# Patient Record
Sex: FEMALE | Race: WHITE | NOT HISPANIC OR LATINO | Employment: FULL TIME | URBAN - METROPOLITAN AREA
[De-identification: names, ages, dates, MRNs, and addresses within clinical notes are randomized per-mention and may not be internally consistent; named-entity substitution may affect disease eponyms.]

---

## 2023-03-21 PROBLEM — N93.9 ABNORMAL UTERINE BLEEDING: Status: ACTIVE | Noted: 2023-03-21

## 2023-03-21 PROBLEM — H81.10 BENIGN PAROXYSMAL POSITIONAL VERTIGO: Status: ACTIVE | Noted: 2023-03-21

## 2023-03-21 PROBLEM — R19.7 DIARRHEA, UNSPECIFIED: Status: ACTIVE | Noted: 2023-03-21

## 2023-03-21 PROBLEM — N90.89 VULVAR IRRITATION: Status: ACTIVE | Noted: 2023-03-21

## 2023-03-21 PROBLEM — G43.009 MIGRAINE WITHOUT AURA AND WITHOUT STATUS MIGRAINOSUS, NOT INTRACTABLE: Status: ACTIVE | Noted: 2023-03-21

## 2023-03-21 PROBLEM — D72.829 LEUKOCYTOSIS: Status: ACTIVE | Noted: 2023-03-21

## 2023-03-21 PROBLEM — N76.2 ALLERGIC VULVITIS: Status: ACTIVE | Noted: 2023-03-21

## 2023-03-21 PROBLEM — M79.89 MASS OF SOFT TISSUE OF NECK: Status: ACTIVE | Noted: 2023-03-21

## 2023-03-21 PROBLEM — J32.3 SPHENOID SINUSITIS: Status: ACTIVE | Noted: 2023-03-21

## 2023-03-21 PROBLEM — R03.0 ELEVATED BLOOD PRESSURE READING: Status: ACTIVE | Noted: 2023-03-21

## 2023-03-21 PROBLEM — K58.0 IRRITABLE BOWEL SYNDROME WITH DIARRHEA: Status: ACTIVE | Noted: 2023-03-21

## 2023-03-21 PROBLEM — E78.5 HYPERLIPIDEMIA, UNSPECIFIED: Status: ACTIVE | Noted: 2023-03-21

## 2023-03-21 PROBLEM — E53.8 VITAMIN B12 DEFICIENCY: Status: ACTIVE | Noted: 2023-03-21

## 2023-03-21 PROBLEM — K13.79 MUCOCELE, BUCCAL: Status: ACTIVE | Noted: 2023-03-21

## 2023-03-21 PROBLEM — Z86.19 HISTORY OF HPV INFECTION: Status: ACTIVE | Noted: 2023-03-21

## 2023-03-21 PROBLEM — R53.83 FATIGUE: Status: ACTIVE | Noted: 2023-03-21

## 2023-03-21 PROBLEM — K90.89: Status: ACTIVE | Noted: 2023-03-21

## 2023-03-21 PROBLEM — R00.0 TACHYCARDIA: Status: ACTIVE | Noted: 2023-03-21

## 2023-03-21 PROBLEM — M79.89 MASS OF SOFT TISSUE OF UPPER EXTREMITY: Status: ACTIVE | Noted: 2023-03-21

## 2023-03-21 PROBLEM — R51.9 HEADACHE: Status: ACTIVE | Noted: 2023-03-21

## 2023-03-21 PROBLEM — E55.9 VITAMIN D DEFICIENCY: Status: ACTIVE | Noted: 2023-03-21

## 2023-03-21 PROBLEM — R42 VERTIGO: Status: ACTIVE | Noted: 2023-03-21

## 2023-03-21 PROBLEM — R00.2 HEART PALPITATIONS: Status: ACTIVE | Noted: 2023-03-21

## 2023-03-21 PROBLEM — D27.9 DERMOID CYST OF OVARY: Status: ACTIVE | Noted: 2023-03-21

## 2023-03-21 PROBLEM — M54.2 NECK PAIN: Status: ACTIVE | Noted: 2023-03-21

## 2023-03-21 PROBLEM — D06.9 SEVERE CERVICAL DYSPLASIA: Status: ACTIVE | Noted: 2023-03-21

## 2023-03-21 PROBLEM — R35.0 INCREASED URINARY FREQUENCY: Status: ACTIVE | Noted: 2023-03-21

## 2023-03-21 PROBLEM — R10.2 ABDOMINAL PAIN, SUPRAPUBIC: Status: ACTIVE | Noted: 2023-03-21

## 2023-03-21 PROBLEM — B37.31 VAGINAL YEAST INFECTION: Status: ACTIVE | Noted: 2023-03-21

## 2023-03-21 PROBLEM — K52.9 CHRONIC DIARRHEA: Status: ACTIVE | Noted: 2023-03-21

## 2023-03-21 PROBLEM — I83.819 VARICOSE VEINS WITH PAIN: Status: ACTIVE | Noted: 2023-03-21

## 2023-03-21 PROBLEM — G44.209 HEADACHE, TENSION-TYPE: Status: ACTIVE | Noted: 2023-03-21

## 2023-03-21 PROBLEM — K57.32 DIVERTICULITIS, COLON: Status: ACTIVE | Noted: 2023-03-21

## 2023-03-21 PROBLEM — N89.8 VAGINAL DISCHARGE: Status: ACTIVE | Noted: 2023-03-21

## 2023-03-21 PROBLEM — F41.9 ANXIETY: Status: ACTIVE | Noted: 2023-03-21

## 2023-03-21 PROBLEM — Z04.9 CONDITION NOT FOUND: Status: ACTIVE | Noted: 2023-03-21

## 2023-03-21 PROBLEM — R87.810 PAP SMEAR OF CERVIX SHOWS HIGH RISK HPV PRESENT: Status: ACTIVE | Noted: 2023-03-21

## 2023-03-21 PROBLEM — N83.201 CYST OF RIGHT OVARY: Status: ACTIVE | Noted: 2023-03-21

## 2023-03-22 ENCOUNTER — OFFICE VISIT (OUTPATIENT)
Dept: PRIMARY CARE | Facility: CLINIC | Age: 38
End: 2023-03-22
Payer: COMMERCIAL

## 2023-03-22 VITALS
OXYGEN SATURATION: 98 % | SYSTOLIC BLOOD PRESSURE: 110 MMHG | WEIGHT: 160 LBS | HEIGHT: 65 IN | TEMPERATURE: 98.6 F | DIASTOLIC BLOOD PRESSURE: 72 MMHG | BODY MASS INDEX: 26.66 KG/M2 | RESPIRATION RATE: 16 BRPM | HEART RATE: 85 BPM

## 2023-03-22 DIAGNOSIS — R07.89 CHEST PAIN, ATYPICAL: ICD-10-CM

## 2023-03-22 DIAGNOSIS — R00.2 HEART PALPITATIONS: Primary | ICD-10-CM

## 2023-03-22 DIAGNOSIS — F41.9 ANXIETY: ICD-10-CM

## 2023-03-22 DIAGNOSIS — R00.0 TACHYCARDIA: ICD-10-CM

## 2023-03-22 PROCEDURE — 93000 ELECTROCARDIOGRAM COMPLETE: CPT | Performed by: FAMILY MEDICINE

## 2023-03-22 PROCEDURE — 1036F TOBACCO NON-USER: CPT | Performed by: FAMILY MEDICINE

## 2023-03-22 PROCEDURE — 99214 OFFICE O/P EST MOD 30 MIN: CPT | Performed by: FAMILY MEDICINE

## 2023-03-22 ASSESSMENT — ENCOUNTER SYMPTOMS
HEADACHES: 1
SHORTNESS OF BREATH: 0
HYPERTENSION: 1

## 2023-03-22 NOTE — PROGRESS NOTES
Subjective     Adelita Rocha is a 37 y.o. female who presents for Hypertension (Pt has been having panic attacks which prompted her to check her BP. It was 155/101. She went to the ER that same night and they confirmed that it was in the 150s/90s. ).    Hypertension  Associated symptoms include chest pain and headaches. Pertinent negatives include no shortness of breath.        Pt is here to follow up on her elevated blood pressures.   She checks her blood pressure before bed once a week.  Usually the BP at night is 120-130/80.  She has been having anxiety attacks in the middle of the night more frequently over the last few weeks.  She went to [facility] ER on 2/28 due to her anxiety attack and chest pain, headache - She had CT head and CT c spine which was unremarkable.  She did not have labs at that time.      She went to [place] ER early yesterday morning due to high BP (155/101) - she was also having a headache and chest pains and trouble breathing.  She had her BP rechecked at triage and it was 155/101, then after a few hours rechecked and was 150/97.  She left without being seen due to length of wait.      She is here today to discuss her BP and anxiety.     Her heartrates have been elevated n the 140-150s over the last 1-2 days.      She denies any known anxiety triggers.  She works in retail, [employer]. She has a good home life.     Mother has HTN.     She denies feeling depressed.  She exercises routinely.  She also gets chest pains with her anxiety and she wants to see cardiologist to get a stress test to rule out cardio issues.     She gets acid reflux frequently as well.  She takes famotidine at bedtime which helps.      Review of Systems   Respiratory:  Negative for shortness of breath.    Cardiovascular:  Positive for chest pain.   Neurological:  Positive for headaches.       Objective     Vitals:    03/22/23 1348   BP: 110/72   BP Location: Left arm   Patient Position: Sitting   BP Cuff Size: Adult   Pulse: 85  "  Resp: 16   Temp: 37 °C (98.6 °F)   TempSrc: Temporal   SpO2: 98%   Weight: 72.6 kg (160 lb)   Height: 1.651 m (5' 5\")        No current outpatient medications     Past Surgical History:   Procedure Laterality Date    OTHER SURGICAL HISTORY  02/12/2021    Loop electrosurgical excision procedure        Social History     Tobacco Use    Smoking status: Never    Smokeless tobacco: Never       Family History   Problem Relation Name Age of Onset    Cancer Mother      Anxiety disorder Mother      Other (colitis) Father      Other (degenerative disc disease) Father      Diverticulitis Father      Cancer Other Grandparent         Immunization History   Administered Date(s) Administered    Influenza, seasonal, injectable 10/03/2019    Pfizer Purple Cap SARS-CoV-2 04/27/2021, 05/18/2021    Tdap 02/28/2020        Physical Exam  Vitals reviewed.   Constitutional:       General: She is not in acute distress.     Appearance: Normal appearance. She is well-developed.   HENT:      Head: Normocephalic and atraumatic.      Nose: Nose normal.   Eyes:      General: Lids are normal.      Extraocular Movements: Extraocular movements intact.      Conjunctiva/sclera: Conjunctivae normal.      Right eye: Right conjunctiva is not injected.      Left eye: Left conjunctiva is not injected.   Cardiovascular:      Rate and Rhythm: Normal rate and regular rhythm.      Heart sounds: No murmur heard.  Pulmonary:      Effort: Pulmonary effort is normal. No respiratory distress.      Breath sounds: Normal breath sounds. No wheezing, rhonchi or rales.   Lymphadenopathy:      Cervical: No cervical adenopathy.   Skin:     General: Skin is warm and dry.      Findings: No rash.   Neurological:      Mental Status: She is alert and oriented to person, place, and time. Mental status is at baseline.      Gait: Gait normal.   Psychiatric:         Mood and Affect: Mood normal.         Behavior: Behavior normal.         Problem List Items Addressed This Visit  "      Anxiety    Relevant Orders    TSH with reflex to Free T4 if abnormal    Heart palpitations - Primary    Relevant Orders    ECG 12 lead (Completed)    Referral to Cardiology    Tachycardia    Relevant Orders    Referral to Cardiology     Other Visit Diagnoses       Chest pain, atypical        Relevant Orders    Referral to Cardiology    CBC and Auto Differential    Comprehensive Metabolic Panel    Lipid Panel          Assessment/Plan        Elevated blood pressure readings (per patient), atypical chest pain, heart palpitations - episodic for a few months - her EKG today was normal sinus rhythm.  I will have her see cardio due to her ongoing symptoms.  If her cardio workup proves to be negative we can consider anxiety treatment to see if this helps with her symptoms.   I will check routine labs as well.  Pt is agreeable with plan.      Follow up after cardiology visit

## 2023-03-28 ENCOUNTER — LAB (OUTPATIENT)
Dept: LAB | Facility: LAB | Age: 38
End: 2023-03-28
Payer: COMMERCIAL

## 2023-03-28 DIAGNOSIS — R07.89 CHEST PAIN, ATYPICAL: ICD-10-CM

## 2023-03-28 DIAGNOSIS — F41.9 ANXIETY: ICD-10-CM

## 2023-03-28 LAB
ALANINE AMINOTRANSFERASE (SGPT) (U/L) IN SER/PLAS: 20 U/L (ref 7–45)
ALBUMIN (G/DL) IN SER/PLAS: 4.3 G/DL (ref 3.4–5)
ALKALINE PHOSPHATASE (U/L) IN SER/PLAS: 53 U/L (ref 33–110)
ANION GAP IN SER/PLAS: 11 MMOL/L (ref 10–20)
ASPARTATE AMINOTRANSFERASE (SGOT) (U/L) IN SER/PLAS: 15 U/L (ref 9–39)
BASOPHILS (10*3/UL) IN BLOOD BY AUTOMATED COUNT: 0.04 X10E9/L (ref 0–0.1)
BASOPHILS/100 LEUKOCYTES IN BLOOD BY AUTOMATED COUNT: 0.7 % (ref 0–2)
BILIRUBIN TOTAL (MG/DL) IN SER/PLAS: 1.3 MG/DL (ref 0–1.2)
CALCIUM (MG/DL) IN SER/PLAS: 9.3 MG/DL (ref 8.6–10.3)
CARBON DIOXIDE, TOTAL (MMOL/L) IN SER/PLAS: 26 MMOL/L (ref 21–32)
CHLORIDE (MMOL/L) IN SER/PLAS: 105 MMOL/L (ref 98–107)
CHOLESTEROL (MG/DL) IN SER/PLAS: 194 MG/DL (ref 0–199)
CHOLESTEROL IN HDL (MG/DL) IN SER/PLAS: 48 MG/DL
CHOLESTEROL/HDL RATIO: 4
CREATININE (MG/DL) IN SER/PLAS: 0.8 MG/DL (ref 0.5–1.05)
EOSINOPHILS (10*3/UL) IN BLOOD BY AUTOMATED COUNT: 0.3 X10E9/L (ref 0–0.7)
EOSINOPHILS/100 LEUKOCYTES IN BLOOD BY AUTOMATED COUNT: 5.5 % (ref 0–6)
ERYTHROCYTE DISTRIBUTION WIDTH (RATIO) BY AUTOMATED COUNT: 13.8 % (ref 11.5–14.5)
ERYTHROCYTE MEAN CORPUSCULAR HEMOGLOBIN CONCENTRATION (G/DL) BY AUTOMATED: 32.5 G/DL (ref 32–36)
ERYTHROCYTE MEAN CORPUSCULAR VOLUME (FL) BY AUTOMATED COUNT: 86 FL (ref 80–100)
ERYTHROCYTES (10*6/UL) IN BLOOD BY AUTOMATED COUNT: 4.77 X10E12/L (ref 4–5.2)
GFR FEMALE: >90 ML/MIN/1.73M2
GLUCOSE (MG/DL) IN SER/PLAS: 87 MG/DL (ref 74–99)
HEMATOCRIT (%) IN BLOOD BY AUTOMATED COUNT: 40.9 % (ref 36–46)
HEMOGLOBIN (G/DL) IN BLOOD: 13.3 G/DL (ref 12–16)
IMMATURE GRANULOCYTES/100 LEUKOCYTES IN BLOOD BY AUTOMATED COUNT: 0.4 % (ref 0–0.9)
LDL: 106 MG/DL (ref 0–99)
LEUKOCYTES (10*3/UL) IN BLOOD BY AUTOMATED COUNT: 5.5 X10E9/L (ref 4.4–11.3)
LYMPHOCYTES (10*3/UL) IN BLOOD BY AUTOMATED COUNT: 1.79 X10E9/L (ref 1.2–4.8)
LYMPHOCYTES/100 LEUKOCYTES IN BLOOD BY AUTOMATED COUNT: 32.8 % (ref 13–44)
MONOCYTES (10*3/UL) IN BLOOD BY AUTOMATED COUNT: 0.33 X10E9/L (ref 0.1–1)
MONOCYTES/100 LEUKOCYTES IN BLOOD BY AUTOMATED COUNT: 6.1 % (ref 2–10)
NEUTROPHILS (10*3/UL) IN BLOOD BY AUTOMATED COUNT: 2.97 X10E9/L (ref 1.2–7.7)
NEUTROPHILS/100 LEUKOCYTES IN BLOOD BY AUTOMATED COUNT: 54.5 % (ref 40–80)
NON HDL CHOLESTEROL: 146 MG/DL
PLATELETS (10*3/UL) IN BLOOD AUTOMATED COUNT: 328 X10E9/L (ref 150–450)
POTASSIUM (MMOL/L) IN SER/PLAS: 3.9 MMOL/L (ref 3.5–5.3)
PROTEIN TOTAL: 7 G/DL (ref 6.4–8.2)
SODIUM (MMOL/L) IN SER/PLAS: 138 MMOL/L (ref 136–145)
THYROTROPIN (MIU/L) IN SER/PLAS BY DETECTION LIMIT <= 0.05 MIU/L: 0.79 MIU/L (ref 0.44–3.98)
TRIGLYCERIDE (MG/DL) IN SER/PLAS: 200 MG/DL (ref 0–149)
UREA NITROGEN (MG/DL) IN SER/PLAS: 9 MG/DL (ref 6–23)
VLDL: 40 MG/DL (ref 0–40)

## 2023-03-28 PROCEDURE — 80053 COMPREHEN METABOLIC PANEL: CPT

## 2023-03-28 PROCEDURE — 85025 COMPLETE CBC W/AUTO DIFF WBC: CPT

## 2023-03-28 PROCEDURE — 80061 LIPID PANEL: CPT

## 2023-03-28 PROCEDURE — 84443 ASSAY THYROID STIM HORMONE: CPT

## 2023-03-28 PROCEDURE — 36415 COLL VENOUS BLD VENIPUNCTURE: CPT

## 2023-06-24 ENCOUNTER — TELEPHONE (OUTPATIENT)
Dept: PRIMARY CARE | Facility: CLINIC | Age: 38
End: 2023-06-24
Payer: COMMERCIAL

## 2023-06-24 NOTE — TELEPHONE ENCOUNTER
On-call note: I spoke with her over the phone.  She woke up tonight with chest pain.  This is different than any chest pain that she has had in the past.  She is not sure if her symptoms are related to either anxiety or reflux.  I discussed that these would be common causes of chest pain, however without examining her I cannot rule out any other more serious causes of chest pain.  Recommended ER for further evaluation of her chest pain

## 2023-09-30 ENCOUNTER — HOSPITAL ENCOUNTER (EMERGENCY)
Facility: HOSPITAL | Age: 38
Discharge: LEFT BEFORE DISCHARGE | End: 2023-09-30
Payer: COMMERCIAL

## 2023-09-30 ENCOUNTER — APPOINTMENT (OUTPATIENT)
Dept: RADIOLOGY | Facility: HOSPITAL | Age: 38
End: 2023-09-30
Payer: COMMERCIAL

## 2023-09-30 VITALS — BODY MASS INDEX: 24.99 KG/M2 | HEIGHT: 65 IN | WEIGHT: 150 LBS

## 2023-09-30 DIAGNOSIS — R07.9 CHEST PAIN, UNSPECIFIED TYPE: Primary | ICD-10-CM

## 2023-09-30 DIAGNOSIS — R19.7 DIARRHEA, UNSPECIFIED TYPE: ICD-10-CM

## 2023-09-30 LAB
ALBUMIN SERPL BCP-MCNC: 4.4 G/DL (ref 3.4–5)
ALP SERPL-CCNC: 53 U/L (ref 33–110)
ALT SERPL W P-5'-P-CCNC: 14 U/L (ref 7–45)
ANION GAP SERPL CALC-SCNC: 12 MMOL/L (ref 10–20)
APPEARANCE UR: ABNORMAL
AST SERPL W P-5'-P-CCNC: 13 U/L (ref 9–39)
BASOPHILS # BLD AUTO: 0.06 X10*3/UL (ref 0–0.1)
BASOPHILS NFR BLD AUTO: 0.8 %
BILIRUB SERPL-MCNC: 1.2 MG/DL (ref 0–1.2)
BILIRUB UR STRIP.AUTO-MCNC: NEGATIVE MG/DL
BUN SERPL-MCNC: 9 MG/DL (ref 6–23)
CALCIUM SERPL-MCNC: 9.3 MG/DL (ref 8.6–10.3)
CARDIAC TROPONIN I PNL SERPL HS: <3 NG/L (ref 0–13)
CHLORIDE SERPL-SCNC: 104 MMOL/L (ref 98–107)
CO2 SERPL-SCNC: 23 MMOL/L (ref 21–32)
COLOR UR: YELLOW
CREAT SERPL-MCNC: 0.79 MG/DL (ref 0.5–1.05)
D DIMER PPP FEU-MCNC: 240 NG/ML FEU
EOSINOPHIL # BLD AUTO: 0.3 X10*3/UL (ref 0–0.7)
EOSINOPHIL NFR BLD AUTO: 4.1 %
ERYTHROCYTE [DISTWIDTH] IN BLOOD BY AUTOMATED COUNT: 14 % (ref 11.5–14.5)
GFR SERPL CREATININE-BSD FRML MDRD: >90 ML/MIN/1.73M*2
GLUCOSE SERPL-MCNC: 101 MG/DL (ref 74–99)
GLUCOSE UR STRIP.AUTO-MCNC: NEGATIVE MG/DL
HCT VFR BLD AUTO: 40.1 % (ref 36–46)
HGB BLD-MCNC: 13.1 G/DL (ref 12–16)
IMM GRANULOCYTES # BLD AUTO: 0.02 X10*3/UL (ref 0–0.7)
IMM GRANULOCYTES NFR BLD AUTO: 0.3 % (ref 0–0.9)
KETONES UR STRIP.AUTO-MCNC: NEGATIVE MG/DL
LEUKOCYTE ESTERASE UR QL STRIP.AUTO: NEGATIVE
LYMPHOCYTES # BLD AUTO: 1.87 X10*3/UL (ref 1.2–4.8)
LYMPHOCYTES NFR BLD AUTO: 25.6 %
MCH RBC QN AUTO: 27.2 PG (ref 26–34)
MCHC RBC AUTO-ENTMCNC: 32.7 G/DL (ref 32–36)
MCV RBC AUTO: 83 FL (ref 80–100)
MONOCYTES # BLD AUTO: 0.49 X10*3/UL (ref 0.1–1)
MONOCYTES NFR BLD AUTO: 6.7 %
NEUTROPHILS # BLD AUTO: 4.57 X10*3/UL (ref 1.2–7.7)
NEUTROPHILS NFR BLD AUTO: 62.5 %
NITRITE UR QL STRIP.AUTO: NEGATIVE
NRBC BLD-RTO: 0 /100 WBCS (ref 0–0)
PH UR STRIP.AUTO: 8 [PH]
PLATELET # BLD AUTO: 334 X10*3/UL (ref 150–450)
PMV BLD AUTO: 8.6 FL (ref 7.5–11.5)
POTASSIUM SERPL-SCNC: 4.1 MMOL/L (ref 3.5–5.3)
PROT SERPL-MCNC: 6.9 G/DL (ref 6.4–8.2)
PROT UR STRIP.AUTO-MCNC: NEGATIVE MG/DL
RBC # BLD AUTO: 4.82 X10*6/UL (ref 4–5.2)
RBC # UR STRIP.AUTO: NEGATIVE /UL
SODIUM SERPL-SCNC: 135 MMOL/L (ref 136–145)
SP GR UR STRIP.AUTO: 1.01
UROBILINOGEN UR STRIP.AUTO-MCNC: <2 MG/DL
WBC # BLD AUTO: 7.3 X10*3/UL (ref 4.4–11.3)

## 2023-09-30 PROCEDURE — 36415 COLL VENOUS BLD VENIPUNCTURE: CPT | Performed by: STUDENT IN AN ORGANIZED HEALTH CARE EDUCATION/TRAINING PROGRAM

## 2023-09-30 PROCEDURE — 71045 X-RAY EXAM CHEST 1 VIEW: CPT | Performed by: STUDENT IN AN ORGANIZED HEALTH CARE EDUCATION/TRAINING PROGRAM

## 2023-09-30 PROCEDURE — 71045 X-RAY EXAM CHEST 1 VIEW: CPT

## 2023-09-30 PROCEDURE — 99283 EMERGENCY DEPT VISIT LOW MDM: CPT

## 2023-09-30 PROCEDURE — 80053 COMPREHEN METABOLIC PANEL: CPT | Performed by: EMERGENCY MEDICINE

## 2023-09-30 PROCEDURE — 85379 FIBRIN DEGRADATION QUANT: CPT | Performed by: EMERGENCY MEDICINE

## 2023-09-30 PROCEDURE — 36415 COLL VENOUS BLD VENIPUNCTURE: CPT | Performed by: EMERGENCY MEDICINE

## 2023-09-30 PROCEDURE — 81003 URINALYSIS AUTO W/O SCOPE: CPT | Performed by: EMERGENCY MEDICINE

## 2023-09-30 PROCEDURE — 84484 ASSAY OF TROPONIN QUANT: CPT | Performed by: EMERGENCY MEDICINE

## 2023-09-30 PROCEDURE — 85025 COMPLETE CBC W/AUTO DIFF WBC: CPT | Performed by: EMERGENCY MEDICINE

## 2023-09-30 NOTE — ED PROVIDER NOTES
HPI   No chief complaint on file.      HPI                    No data recorded                Patient History   Past Medical History:   Diagnosis Date    Encounter for immunization 10/03/2019    Encounter for vaccination    Encounter for supervision of normal pregnancy, unspecified, unspecified trimester 04/14/2020    Prenatal care    Unspecified pre-eclampsia, third trimester 04/14/2020    Pre-eclampsia in third trimester     Past Surgical History:   Procedure Laterality Date    OTHER SURGICAL HISTORY  02/12/2021    Loop electrosurgical excision procedure     Family History   Problem Relation Name Age of Onset    Cancer Mother      Anxiety disorder Mother      Other (colitis) Father      Other (degenerative disc disease) Father      Diverticulitis Father      Cancer Other Grandparent      Social History     Tobacco Use    Smoking status: Never    Smokeless tobacco: Never   Substance Use Topics    Alcohol use: Not on file    Drug use: Not on file       Physical Exam   ED Triage Vitals   Temp Pulse Resp BP   -- -- -- --      SpO2 Temp src Heart Rate Source Patient Position   -- -- -- --      BP Location FiO2 (%)     -- --       Physical Exam    ED Course & MDM   Diagnoses as of 09/30/23 1952   Chest pain, unspecified type   Diarrhea, unspecified type       Medical Decision Making  Received signout from prior physician.  Please refer to their documentation for full history and physical, ED course until this point.  Briefly, patient is a 38-year-old female who presents with 2 months of substernal chest pain.  Patient signed out to me pending CT angio of the chest to rule out pulmonary embolism.  She had an elevated D-dimer in the 600s.    Patient was not able to wait any longer for the CT imaging.  She needed to leave and take care of things at home.  As her work-up has not been completed, she was informed that she will need to sign out AGAINST MEDICAL ADVICE.  She is agreeable with this and wishes to sign out AGAINST  MEDICAL ADVICE.    The patient wishes to sign out AGAINST MEDICAL ADVICE. The nursing staff and physicians insisted with the patient to stay for further investigations and evaluation. The patient understands and appreciates the need for further medical workup, possible admission diagnoses and their prognosis, and the likelihood of risks and benefits of leaving the hospital. The patient signed documentation that they would like to leave at their own insistence and against the advice of the physicians. The patient was advised of the possible dangers to their life and health from this departure, and the patient assumes the risks and consequences involved and releases the staff and the Medical Center from any liability in connection with leaving the hospital AGAINST MEDICAL ADVICE. The patient understands that we cannot fully assess the patient for the current complaint at this time because they do not want us to perform our investigations. The patient understands and the possibilities of death and disability and consequences of leaving AGAINST MEDICAL ADVICE. The patient has been informed of the dangers of leaving AGAINST MEDICAL ADVICE and still is insistent upon signing out. The patient has arrived at this decision without being subjected to coercion and with a full understanding in appreciation of the risks, benefits, and alternatives of the decision. The patient is not intoxicated. The patient has full decision making capacity.    Patient is noted to be normotensive without any tachycardia, respiratory distress, hypoxia, tachypnea.  Her symptoms been ongoing for last 2 weeks.  She is instructed to follow-up with her primary care physician as soon as possible.  She is instructed to return to return to the emergency department if she should change her mind or develop any recurrent or worsening of her symptoms.  Patient expresses understanding and agreement with this.  Patient signed out AGAINST MEDICAL  ADVICE.    Mehran Lubin DO, PGY 3  Emergency Medicine Resident        Procedure  Procedures

## 2023-10-02 ENCOUNTER — HOSPITAL ENCOUNTER (OUTPATIENT)
Dept: CARDIOLOGY | Facility: HOSPITAL | Age: 38
Discharge: HOME | End: 2023-10-02
Payer: COMMERCIAL

## 2023-10-02 DIAGNOSIS — R00.2 PALPITATIONS: ICD-10-CM

## 2023-10-02 PROCEDURE — 93270 REMOTE 30 DAY ECG REV/REPORT: CPT

## 2023-10-02 PROCEDURE — 93272 ECG/REVIEW INTERPRET ONLY: CPT | Performed by: INTERNAL MEDICINE

## 2023-10-02 NOTE — ED PROVIDER NOTES
HPI   No chief complaint on file.      HPI  38 year-old female presenting to [facility] ED with a complaint of chest pain. She reports having substernal chest pain for 2 months  intermittently but has been constant for the past 2-3 days and giving her trouble sleeping. Describes it as a “trapped gassy feeling”, radiating to her back, at a 4-5/10 currently, worse in the mornings, and with associated nausea and occasionally shortness of breath. Has diarrhea at least 1x per day since. Reports utilizing Tums, Famotidine, and pesto bismol without much relief. She has also tried avoiding certain foods and paying attention to what may be triggering her symptoms. Denies vomiting, palpitations, redness in diarrhea, ever smoking, leg swelling, history of blood clot, coughing, bed rest or immobilization, recent surgeries, or taking OCPs.     PMHx: Denies  OBGYN: A2 (miscarriage & ectopic), LNMP 1month ago, sexually active with   PSHx: Laparoscopic ectopic resection                    No data recorded                Patient History   Past Medical History:   Diagnosis Date    Encounter for immunization 10/03/2019    Encounter for vaccination    Encounter for supervision of normal pregnancy, unspecified, unspecified trimester 2020    Prenatal care    Unspecified pre-eclampsia, third trimester 2020    Pre-eclampsia in third trimester     Past Surgical History:   Procedure Laterality Date    OTHER SURGICAL HISTORY  2021    Loop electrosurgical excision procedure     Family History   Problem Relation Name Age of Onset    Cancer Mother      Anxiety disorder Mother      Other (colitis) Father      Other (degenerative disc disease) Father      Diverticulitis Father      Cancer Other Grandparent      Social History     Tobacco Use    Smoking status: Never    Smokeless tobacco: Never   Substance Use Topics    Alcohol use: Not on file    Drug use: Not on file       Physical Exam   ED Triage Vitals   Temp Pulse  Resp BP   -- -- -- --      SpO2 Temp src Heart Rate Source Patient Position   -- -- -- --      BP Location FiO2 (%)     -- --       Physical Exam  VITALS: I have reviewed the triage vital signs.   GENERAL: Well developed, well appearing young adult female in no acute distress.  Resting comfortably in bed.  NEURO: Alert and oriented. Moves all extremities. Face is symmetric and expressive.   EYES: EOMI. No scleral icterus or conjunctival injection. No discharge.   HENT: Normocephalic, atraumatic. Hearing is grossly intact. Nares grossly patent and without discharge. Mucous membranes moist with no visible lesions.   NECK: Trachea midline. Patient moves neck without restriction.   CARDIO: Rhythm regular. Normal rate. No murmurs, rubs, or gallops. Radial/Dorsal pulses 2+ and equal bilaterally. No lower extremity edema.   PULM: Lungs clear to auscultation in all fields. No wheezes, rales, or rhonchi. No conversational dyspnea. No splinting, stridor, or accessory muscle use.    GI: Abdomen is soft and non-distended.  Epigastric discomfort to palpation. No guarding or rigidity.   : No suprapubic tenderness. No CVA tenderness.  MUSCULOSKELETAL: Symmetric muscle build. No joint swelling. No clubbing, cyanosis, or deformity.   SKIN: Warm, dry, and intact. Normal turgor. No rash or lesions appreciated.   PSYCH: Mood, affect, and interaction is appropriate to the setting.     ED Course & MDM   Diagnoses as of 10/01/23 2316   Chest pain, unspecified type   Diarrhea, unspecified type       Medical Decision Making  38-year-old female with a history of mitral biopsing to ED with complaint of chest pain and diarrhea.  Patient is afebrile, hemodynamically stable on room air, and in no acute distress.  Physical exam findings mentioned above.  CBC, CMP, EKG, troponins, D-dimer, UA, serum hCG, and CXR ordered.  BMX solution provided.    Procedure  Procedures    My independent interpretation of labs:  Labs returned unimpressive for  systemic inflammation or infection, acute anemia or blood loss, JAROD, hepatitis, elevated troponins, UTI, or electrolyte abnormalities.  Informed D-dimer was mildly elevated in the 600s and CT angio chest for PE ordered.    Reassessment:  Discussed findings with patient and recommendations for PE analysis in which she is agreeable to obtaining CT imaging.    Disposition: Patient handed off to Dr. Lubin pending CXR and CT angio chest for PE.    Discussed with supervising attending, Dr. Nigel Gupta MD  Emergency Medicine, PGY-2       Nabil Gupta MD  Resident  10/01/23 [id number]

## 2023-10-11 ENCOUNTER — HOSPITAL ENCOUNTER (OUTPATIENT)
Dept: CARDIOLOGY | Facility: HOSPITAL | Age: 38
Discharge: HOME | End: 2023-10-11
Payer: COMMERCIAL

## 2023-10-11 LAB
ATRIAL RATE: 93 BPM
P AXIS: 51 DEGREES
P OFFSET: 207 MS
P ONSET: 156 MS
PR INTERVAL: 136 MS
Q ONSET: 224 MS
QRS COUNT: 15 BEATS
QRS DURATION: 84 MS
QT INTERVAL: 342 MS
QTC CALCULATION(BAZETT): 425 MS
QTC FREDERICIA: 396 MS
R AXIS: 53 DEGREES
T AXIS: 64 DEGREES
T OFFSET: 395 MS
VENTRICULAR RATE: 93 BPM

## 2023-10-11 PROCEDURE — 93005 ELECTROCARDIOGRAM TRACING: CPT

## 2023-11-01 ENCOUNTER — TELEPHONE (OUTPATIENT)
Dept: PRIMARY CARE | Facility: CLINIC | Age: 38
End: 2023-11-01
Payer: COMMERCIAL

## 2023-11-01 NOTE — TELEPHONE ENCOUNTER
Pt called stating she never got a chance to go to the GI that Dr. Barkley recommended for her GERD. She is asking if she can have the referral put in again so she can schedule an appt.

## 2023-11-06 ENCOUNTER — APPOINTMENT (OUTPATIENT)
Dept: CARDIOLOGY | Facility: HOSPITAL | Age: 38
End: 2023-11-06
Payer: COMMERCIAL

## 2023-11-28 ENCOUNTER — APPOINTMENT (OUTPATIENT)
Dept: CARDIOLOGY | Facility: CLINIC | Age: 38
End: 2023-11-28
Payer: COMMERCIAL

## 2023-12-05 ENCOUNTER — APPOINTMENT (OUTPATIENT)
Dept: CARDIOLOGY | Facility: CLINIC | Age: 38
End: 2023-12-05
Payer: COMMERCIAL

## 2023-12-12 ENCOUNTER — OFFICE VISIT (OUTPATIENT)
Dept: PRIMARY CARE | Facility: CLINIC | Age: 38
End: 2023-12-12
Payer: COMMERCIAL

## 2023-12-12 VITALS
RESPIRATION RATE: 18 BRPM | WEIGHT: 147 LBS | DIASTOLIC BLOOD PRESSURE: 76 MMHG | BODY MASS INDEX: 24.49 KG/M2 | HEART RATE: 77 BPM | HEIGHT: 65 IN | OXYGEN SATURATION: 96 % | TEMPERATURE: 98.7 F | SYSTOLIC BLOOD PRESSURE: 124 MMHG

## 2023-12-12 DIAGNOSIS — R10.13 EPIGASTRIC PAIN: ICD-10-CM

## 2023-12-12 DIAGNOSIS — K58.0 IRRITABLE BOWEL SYNDROME WITH DIARRHEA: ICD-10-CM

## 2023-12-12 DIAGNOSIS — Z00.00 HEALTHCARE MAINTENANCE: ICD-10-CM

## 2023-12-12 DIAGNOSIS — E55.9 VITAMIN D DEFICIENCY: ICD-10-CM

## 2023-12-12 DIAGNOSIS — K21.9 GASTROESOPHAGEAL REFLUX DISEASE, UNSPECIFIED WHETHER ESOPHAGITIS PRESENT: Primary | ICD-10-CM

## 2023-12-12 DIAGNOSIS — K52.9 CHRONIC DIARRHEA: ICD-10-CM

## 2023-12-12 PROCEDURE — 99214 OFFICE O/P EST MOD 30 MIN: CPT | Performed by: FAMILY MEDICINE

## 2023-12-12 PROCEDURE — 1036F TOBACCO NON-USER: CPT | Performed by: FAMILY MEDICINE

## 2023-12-12 RX ORDER — FAMOTIDINE 20 MG/1
1 TABLET, FILM COATED ORAL DAILY
COMMUNITY
End: 2023-12-12

## 2023-12-12 RX ORDER — PANTOPRAZOLE SODIUM 40 MG/1
40 TABLET, DELAYED RELEASE ORAL
Qty: 30 TABLET | Refills: 1 | Status: SHIPPED | OUTPATIENT
Start: 2023-12-12 | End: 2024-01-09 | Stop reason: ALTCHOICE

## 2023-12-12 ASSESSMENT — ENCOUNTER SYMPTOMS
HEADACHES: 0
SHORTNESS OF BREATH: 0

## 2023-12-12 NOTE — PROGRESS NOTES
"Subjective     Adelita Rocha is a 38 y.o. female who presents for GI Problem.    GI Problem         Pt reports ongoing epigastric burning pain, worse with laying down . She used to have burping which has resolved.  She had been on famotidine 20 mg at bedtime which she didn't feel helped with the epigastric and lower chest burning pain so she stopped it.  Tums also has not been helping.  She has never tried PPI therapy.  No hx of EGD procedure.  She is requesting referral to GI.  She has been trying to eat dinner earlier.  No recent acid reflux symptoms.  No nighttime cough.  No hx of h pylori gastritis.  She drinks 1 cup of coffee in the morning, uses oat milk with it.  She does report GERD food triggers with fatty foods, greasy fried foods.  She avoids ETOH and citrus.  No blood in stools.      Pt has lower abdominal pain , bilateral, diarrhea.  She has tried cutting back on dairy due to episodic diarrhea.   Hx of acute diverticulitis in 2020, noted on CT a/p.  Pt denies any fevers, chills, or LLQ abd pain.  She does not feel that her current symptoms are related to her hx of diverticulitis.  Nonsmoker.     She takes ibuprofen as needed for headaches, usually during menses, 3-5 days per month, however she was told by her eye doctor to avoid nsaids.  She takes excedrin as needed for headaches.      Review of Systems   Respiratory:  Negative for shortness of breath.    Cardiovascular:  Negative for chest pain.   Neurological:  Negative for headaches.       Objective     Vitals:    12/12/23 1131   BP: 124/76   BP Location: Left arm   Patient Position: Sitting   Pulse: 77   Resp: 18   Temp: 37.1 °C (98.7 °F)   TempSrc: Temporal   SpO2: 96%   Weight: 66.7 kg (147 lb)   Height: 1.651 m (5' 5\")        Current Outpatient Medications   Medication Instructions    mag hydrox/aluminum hyd/simeth (ALUM-MAG HYDROXIDE-SIMETH ORAL) oral    pantoprazole (PROTONIX) 40 mg, oral, Daily before breakfast, Do not crush, chew, or split. "        Past Surgical History:   Procedure Laterality Date    OTHER SURGICAL HISTORY  02/12/2021    Loop electrosurgical excision procedure        Social History     Tobacco Use    Smoking status: Never    Smokeless tobacco: Never        Family History   Problem Relation Name Age of Onset    Cancer Mother      Anxiety disorder Mother      Other (colitis) Father      Other (degenerative disc disease) Father      Diverticulitis Father      Cancer Other Grandparent         Immunization History   Administered Date(s) Administered    Influenza, seasonal, injectable 10/03/2019    Pfizer Purple Cap SARS-CoV-2 04/27/2021, 05/18/2021    Tdap vaccine, age 7 year and older (BOOSTRIX) 02/28/2020        Physical Exam  Vitals reviewed.   Constitutional:       General: She is not in acute distress.     Appearance: Normal appearance. She is not ill-appearing.   HENT:      Nose: Nose normal.      Mouth/Throat:      Mouth: Mucous membranes are moist.      Pharynx: No oropharyngeal exudate or posterior oropharyngeal erythema.   Eyes:      Conjunctiva/sclera: Conjunctivae normal.   Neck:      Thyroid: No thyroid mass or thyromegaly.   Cardiovascular:      Rate and Rhythm: Normal rate and regular rhythm.      Heart sounds: No murmur heard.  Pulmonary:      Effort: No respiratory distress.      Breath sounds: Normal breath sounds. No wheezing, rhonchi or rales.   Abdominal:      General: Abdomen is flat. There is no distension.      Palpations: Abdomen is soft. There is no mass.      Tenderness: There is no abdominal tenderness.   Musculoskeletal:         General: Normal range of motion.   Lymphadenopathy:      Cervical: No cervical adenopathy.   Skin:     General: Skin is warm and dry.      Findings: No lesion or rash.   Neurological:      Mental Status: She is alert and oriented to person, place, and time. Mental status is at baseline.   Psychiatric:         Mood and Affect: Mood normal.         Behavior: Behavior normal.          Problem List Items Addressed This Visit       Chronic diarrhea    Irritable bowel syndrome with diarrhea    Relevant Orders    TSH with reflex to Free T4 if abnormal    Vitamin D deficiency    Relevant Orders    Vitamin D 25-Hydroxy,Total (for eval of Vitamin D levels)    Gastroesophageal reflux disease - Primary    Relevant Medications    pantoprazole (ProtoNix) 40 mg EC tablet    Other Relevant Orders    Lipase    H. Pylori Antigen, Stool    Epigastric pain    Relevant Orders    Lipase    H. Pylori Antigen, Stool    TSH with reflex to Free T4 if abnormal     Other Visit Diagnoses       Healthcare maintenance        Relevant Orders    Comprehensive Metabolic Panel    Lipid Panel    CBC and Auto Differential    Hemoglobin A1C    TSH with reflex to Free T4 if abnormal            Assessment/Plan     GERD symptoms, epigastric pain, hx of NSAID use - will start PPI daily, have pt see GI, may need EGD to check for gastritis, PUD.  I will also check h. Pylori gastritis with stool testing.  Pt agreeable.  Pt aware to avoid NSAIDs.      IBS symptoms with diarrhea - see GI    Complete routine labs    Flu vaccine declined today     Follow up 1 month or sooner if needed

## 2023-12-14 ENCOUNTER — APPOINTMENT (OUTPATIENT)
Dept: CARDIOLOGY | Facility: CLINIC | Age: 38
End: 2023-12-14
Payer: COMMERCIAL

## 2024-01-08 ENCOUNTER — LAB (OUTPATIENT)
Dept: LAB | Facility: LAB | Age: 39
End: 2024-01-08
Payer: COMMERCIAL

## 2024-01-08 DIAGNOSIS — E55.9 VITAMIN D DEFICIENCY: ICD-10-CM

## 2024-01-08 DIAGNOSIS — Z00.00 HEALTHCARE MAINTENANCE: ICD-10-CM

## 2024-01-08 DIAGNOSIS — K21.9 GASTROESOPHAGEAL REFLUX DISEASE, UNSPECIFIED WHETHER ESOPHAGITIS PRESENT: ICD-10-CM

## 2024-01-08 DIAGNOSIS — R10.13 EPIGASTRIC PAIN: ICD-10-CM

## 2024-01-08 DIAGNOSIS — K58.0 IRRITABLE BOWEL SYNDROME WITH DIARRHEA: ICD-10-CM

## 2024-01-08 LAB
25(OH)D3 SERPL-MCNC: 20 NG/ML (ref 30–100)
ALBUMIN SERPL BCP-MCNC: 4.2 G/DL (ref 3.4–5)
ALP SERPL-CCNC: 50 U/L (ref 33–110)
ALT SERPL W P-5'-P-CCNC: 19 U/L (ref 7–45)
ANION GAP SERPL CALC-SCNC: 9 MMOL/L (ref 10–20)
AST SERPL W P-5'-P-CCNC: 16 U/L (ref 9–39)
BASOPHILS # BLD AUTO: 0.05 X10*3/UL (ref 0–0.1)
BASOPHILS NFR BLD AUTO: 0.8 %
BILIRUB SERPL-MCNC: 1.2 MG/DL (ref 0–1.2)
BUN SERPL-MCNC: 11 MG/DL (ref 6–23)
CALCIUM SERPL-MCNC: 8.9 MG/DL (ref 8.6–10.3)
CHLORIDE SERPL-SCNC: 108 MMOL/L (ref 98–107)
CHOLEST SERPL-MCNC: 181 MG/DL (ref 0–199)
CHOLESTEROL/HDL RATIO: 3.3
CO2 SERPL-SCNC: 26 MMOL/L (ref 21–32)
CREAT SERPL-MCNC: 0.67 MG/DL (ref 0.5–1.05)
EGFRCR SERPLBLD CKD-EPI 2021: >90 ML/MIN/1.73M*2
EOSINOPHIL # BLD AUTO: 0.4 X10*3/UL (ref 0–0.7)
EOSINOPHIL NFR BLD AUTO: 6.8 %
ERYTHROCYTE [DISTWIDTH] IN BLOOD BY AUTOMATED COUNT: 13.8 % (ref 11.5–14.5)
EST. AVERAGE GLUCOSE BLD GHB EST-MCNC: 108 MG/DL
GLUCOSE SERPL-MCNC: 86 MG/DL (ref 74–99)
HBA1C MFR BLD: 5.4 %
HCT VFR BLD AUTO: 40.4 % (ref 36–46)
HDLC SERPL-MCNC: 54.3 MG/DL
HGB BLD-MCNC: 13.1 G/DL (ref 12–16)
IMM GRANULOCYTES # BLD AUTO: 0.01 X10*3/UL (ref 0–0.7)
IMM GRANULOCYTES NFR BLD AUTO: 0.2 % (ref 0–0.9)
LDLC SERPL CALC-MCNC: 110 MG/DL
LIPASE SERPL-CCNC: 10 U/L (ref 9–82)
LYMPHOCYTES # BLD AUTO: 1.79 X10*3/UL (ref 1.2–4.8)
LYMPHOCYTES NFR BLD AUTO: 30.3 %
MCH RBC QN AUTO: 27.7 PG (ref 26–34)
MCHC RBC AUTO-ENTMCNC: 32.4 G/DL (ref 32–36)
MCV RBC AUTO: 85 FL (ref 80–100)
MONOCYTES # BLD AUTO: 0.36 X10*3/UL (ref 0.1–1)
MONOCYTES NFR BLD AUTO: 6.1 %
NEUTROPHILS # BLD AUTO: 3.29 X10*3/UL (ref 1.2–7.7)
NEUTROPHILS NFR BLD AUTO: 55.8 %
NON HDL CHOLESTEROL: 127 MG/DL (ref 0–149)
NRBC BLD-RTO: 0 /100 WBCS (ref 0–0)
PLATELET # BLD AUTO: 311 X10*3/UL (ref 150–450)
POTASSIUM SERPL-SCNC: 4.2 MMOL/L (ref 3.5–5.3)
PROT SERPL-MCNC: 6.8 G/DL (ref 6.4–8.2)
RBC # BLD AUTO: 4.73 X10*6/UL (ref 4–5.2)
SODIUM SERPL-SCNC: 139 MMOL/L (ref 136–145)
TRIGL SERPL-MCNC: 86 MG/DL (ref 0–149)
TSH SERPL-ACNC: 0.61 MIU/L (ref 0.44–3.98)
VLDL: 17 MG/DL (ref 0–40)
WBC # BLD AUTO: 5.9 X10*3/UL (ref 4.4–11.3)

## 2024-01-08 PROCEDURE — 84443 ASSAY THYROID STIM HORMONE: CPT

## 2024-01-08 PROCEDURE — 82306 VITAMIN D 25 HYDROXY: CPT

## 2024-01-08 PROCEDURE — 36415 COLL VENOUS BLD VENIPUNCTURE: CPT

## 2024-01-08 PROCEDURE — 83036 HEMOGLOBIN GLYCOSYLATED A1C: CPT

## 2024-01-08 PROCEDURE — 83690 ASSAY OF LIPASE: CPT

## 2024-01-08 PROCEDURE — 80053 COMPREHEN METABOLIC PANEL: CPT

## 2024-01-08 PROCEDURE — 85025 COMPLETE CBC W/AUTO DIFF WBC: CPT

## 2024-01-08 PROCEDURE — 80061 LIPID PANEL: CPT

## 2024-01-09 ENCOUNTER — OFFICE VISIT (OUTPATIENT)
Dept: CARDIOLOGY | Facility: CLINIC | Age: 39
End: 2024-01-09
Payer: COMMERCIAL

## 2024-01-09 VITALS
HEIGHT: 65 IN | BODY MASS INDEX: 24.49 KG/M2 | DIASTOLIC BLOOD PRESSURE: 80 MMHG | SYSTOLIC BLOOD PRESSURE: 112 MMHG | HEART RATE: 88 BPM | WEIGHT: 147 LBS

## 2024-01-09 DIAGNOSIS — R00.2 HEART PALPITATIONS: Primary | ICD-10-CM

## 2024-01-09 PROCEDURE — 99213 OFFICE O/P EST LOW 20 MIN: CPT | Performed by: INTERNAL MEDICINE

## 2024-01-09 PROCEDURE — 1036F TOBACCO NON-USER: CPT | Performed by: INTERNAL MEDICINE

## 2024-01-09 NOTE — PATIENT INSTRUCTIONS

## 2024-01-09 NOTE — PROGRESS NOTES
"Chief Complaint:   Please see below.     History Of Present Illness:    Adelita Rocha is a 38 y.o. female presenting with palpitations.    This 38-year-old woman complains of palpitations as described and has had multiple visits to the emergency room since December 2020. On some of those visits, she has left without being seen. On at least 1 occasion she had \"tachycardia\" according to providers in the emergency room. On exam she has a soft midsystolic click. Her symptoms are not exertional. She consumes 1 coffee per day, and does not have sleep apnea or related symptoms.     She continues to experiences palpitations that she describes as skipping beats.  These symptoms occur when she is lying down trying to sleep, three times a week.  She has cut out caffeine, and does not drink alcohol, but there has been no change in her symptoms.      She exercises four to five times a week, including running 5 miles in an hour, or lifting.  She consumes sports drinks after her workouts.      She elected not to have the echocardiogram done.     Last Recorded Vitals:  Vitals:    01/09/24 1300   BP: 112/80   BP Location: Left arm   Patient Position: Sitting   Pulse: 88   Weight: 66.7 kg (147 lb)   Height: 1.651 m (5' 5\")       Past Medical History:  She has a past medical history of Encounter for immunization (10/03/2019), Encounter for supervision of normal pregnancy, unspecified, unspecified trimester (04/14/2020), and Unspecified pre-eclampsia, third trimester (04/14/2020).    Past Surgical History:  She has a past surgical history that includes Other surgical history (02/12/2021).      Social History:  She reports that she quit smoking about 10 years ago. Her smoking use included cigarettes. She has never used smokeless tobacco. She reports that she does not currently use alcohol. She reports that she does not use drugs.    Family History:  Family History   Problem Relation Name Age of Onset    Cancer Mother      Anxiety " disorder Mother      Other (colitis) Father      Other (degenerative disc disease) Father      Diverticulitis Father      Cancer Other Grandparent         Allergies:  Patient has no known allergies.    Outpatient Medications:  No current outpatient medications    Physical Exam:  CHEST: Clear to auscultation  CARDIAC: Regular rhythm and rate, normal S1 and S2, no murmur rub or gallop; carotids are brisk without bruits, PMI is not displaced  ABDOMEN: Active bowel sounds, no tenderness, no evidence of ascites  EXTREMITIES: No clubbing, cyanosis, edema, or tenderness    Last Labs:  CBC -  Lab Results   Component Value Date    WBC 5.9 01/08/2024    HGB 13.1 01/08/2024    HCT 40.4 01/08/2024    MCV 85 01/08/2024     01/08/2024       CMP -  Lab Results   Component Value Date    CALCIUM 8.9 01/08/2024    PROT 6.8 01/08/2024    ALBUMIN 4.2 01/08/2024    AST 16 01/08/2024    ALT 19 01/08/2024    ALKPHOS 50 01/08/2024    BILITOT 1.2 01/08/2024       LIPID PANEL -   Lab Results   Component Value Date    CHOL 181 01/08/2024    TRIG 86 01/08/2024    HDL 54.3 01/08/2024    CHHDL 3.3 01/08/2024    LDLF 106 (H) 03/28/2023    VLDL 17 01/08/2024    NHDL 127 01/08/2024       RENAL FUNCTION PANEL -   Lab Results   Component Value Date    GLUCOSE 86 01/08/2024     01/08/2024    K 4.2 01/08/2024     (H) 01/08/2024    CO2 26 01/08/2024    ANIONGAP 9 (L) 01/08/2024    BUN 11 01/08/2024    CREATININE 0.67 01/08/2024    CALCIUM 8.9 01/08/2024    ALBUMIN 4.2 01/08/2024        Lab Results   Component Value Date    HGBA1C 5.4 01/08/2024         Lab review: I have Chemistry CMP:   Lab Results   Component Value Date    ALBUMIN 4.2 01/08/2024    CALCIUM 8.9 01/08/2024    CO2 26 01/08/2024    CREATININE 0.67 01/08/2024    GLUCOSE 86 01/08/2024    BILITOT 1.2 01/08/2024    PROT 6.8 01/08/2024    ALT 19 01/08/2024    AST 16 01/08/2024    ALKPHOS 50 01/08/2024   , Chemistry BMP   Lab Results   Component Value Date    GLUCOSE 86  01/08/2024    CALCIUM 8.9 01/08/2024    CO2 26 01/08/2024    CREATININE 0.67 01/08/2024   , and CBC:  Lab Results   Component Value Date    WBC 5.9 01/08/2024    RBC 4.73 01/08/2024    HGB 13.1 01/08/2024    HCT 40.4 01/08/2024    MCV 85 01/08/2024    MCH 27.7 01/08/2024    MCHC 32.4 01/08/2024    RDW 13.8 01/08/2024    MPV 8.6 09/30/2023    NRBC 0.0 01/08/2024     Diagnostic review: I have independently interpreted the monitor .  My findings are as summarized in the report..    Assessment/Plan   Problem List Items Addressed This Visit          Cardiac and Vasculature    Heart palpitations - Primary     I reviewed with the patient the results of her monitor study, and provided reassurance.  Follow-up will be on an as-needed basis.    Fortino Marquez MD

## 2024-01-19 ENCOUNTER — APPOINTMENT (OUTPATIENT)
Dept: RADIOLOGY | Facility: HOSPITAL | Age: 39
End: 2024-01-19
Payer: COMMERCIAL

## 2024-02-09 ENCOUNTER — HOSPITAL ENCOUNTER (OUTPATIENT)
Dept: RADIOLOGY | Facility: HOSPITAL | Age: 39
Discharge: HOME | End: 2024-02-09
Payer: COMMERCIAL

## 2024-02-09 DIAGNOSIS — N63.0 UNSPECIFIED LUMP IN UNSPECIFIED BREAST: ICD-10-CM

## 2024-02-09 DIAGNOSIS — R92.8 FOLLOW-UP EXAMINATION OF ABNORMAL MAMMOGRAM: ICD-10-CM

## 2024-02-09 PROCEDURE — 77065 DX MAMMO INCL CAD UNI: CPT | Mod: LT

## 2024-02-09 PROCEDURE — 77065 DX MAMMO INCL CAD UNI: CPT | Mod: LEFT SIDE | Performed by: RADIOLOGY

## 2024-07-03 ENCOUNTER — TELEPHONE (OUTPATIENT)
Dept: OBSTETRICS AND GYNECOLOGY | Facility: CLINIC | Age: 39
End: 2024-07-03

## 2024-07-03 DIAGNOSIS — R92.8 ABNORMAL MAMMOGRAM OF LEFT BREAST: ICD-10-CM

## 2024-07-18 ENCOUNTER — OFFICE VISIT (OUTPATIENT)
Dept: PRIMARY CARE | Facility: CLINIC | Age: 39
End: 2024-07-18
Payer: COMMERCIAL

## 2024-07-18 VITALS
SYSTOLIC BLOOD PRESSURE: 120 MMHG | DIASTOLIC BLOOD PRESSURE: 82 MMHG | RESPIRATION RATE: 18 BRPM | TEMPERATURE: 98.9 F | HEART RATE: 77 BPM | OXYGEN SATURATION: 96 % | BODY MASS INDEX: 24.83 KG/M2 | WEIGHT: 149 LBS | HEIGHT: 65 IN

## 2024-07-18 DIAGNOSIS — G44.209 TENSION HEADACHE: Primary | ICD-10-CM

## 2024-07-18 PROCEDURE — 3008F BODY MASS INDEX DOCD: CPT | Performed by: FAMILY MEDICINE

## 2024-07-18 PROCEDURE — 1036F TOBACCO NON-USER: CPT | Performed by: FAMILY MEDICINE

## 2024-07-18 PROCEDURE — 99213 OFFICE O/P EST LOW 20 MIN: CPT | Performed by: FAMILY MEDICINE

## 2024-07-18 RX ORDER — NAPROXEN 500 MG/1
500 TABLET ORAL 2 TIMES DAILY PRN
Qty: 28 TABLET | Refills: 0 | Status: SHIPPED | OUTPATIENT
Start: 2024-07-18 | End: 2024-08-01

## 2024-07-18 NOTE — PROGRESS NOTES
"Liudmila Rocha is a 39 y.o. female who presents for Sinus Problem and Neck Pain.    HPI     Pt reports neck pain (upper neck and traps) which started a few weeks ago.  She thought she \"pinched a nerve\".  She denies neck injury.  She does resistance training/weight lifting.  She reports a shooting pain down her right upper back which has resolved.  She also reports pain that radiates from her neck to the front of her head.  She also reports head pressure behind both eyes and in back of her head.  She reports hx of tension headaches, usually improves with nsaids.  She took four ibuprofen 200 mg this morning which helped with her headache/neck pain.  She denies room spinning dizziness.  Hx of migraines.  No current migraines.  No nausea or vomiting.  Hx of neuro evaluation and MRI brain in 2020. MRI brain was unremarkable.  No sinus pressure.  No nasal congestion or purulent rhinorrhea.  No fevers or chills. No neck stiffness.     Objective     Vitals:    07/18/24 1411   BP: 120/82   BP Location: Left arm   Patient Position: Sitting   Pulse: 77   Resp: 18   Temp: 37.2 °C (98.9 °F)   TempSrc: Temporal   SpO2: 96%   Weight: 67.6 kg (149 lb)   Height: 1.651 m (5' 5\")        Current Outpatient Medications   Medication Instructions    naproxen (NAPROSYN) 500 mg, oral, 2 times daily PRN        No Known Allergies     Past Surgical History:   Procedure Laterality Date    OTHER SURGICAL HISTORY  02/12/2021    Loop electrosurgical excision procedure        Social History     Tobacco Use    Smoking status: Former     Current packs/day: 0.00     Types: Cigarettes     Quit date: 2014     Years since quitting: 10.5    Smokeless tobacco: Never   Vaping Use    Vaping status: Never Used   Substance Use Topics    Alcohol use: Not Currently    Drug use: Never        Social History     Substance and Sexual Activity   Alcohol Use Not Currently       Family History   Problem Relation Name Age of Onset    Cancer Mother      " Anxiety disorder Mother      Other (colitis) Father      Other (degenerative disc disease) Father      Diverticulitis Father      Cancer Other Grandparent         Immunization History   Administered Date(s) Administered    Influenza, seasonal, injectable 10/03/2019    Pfizer Purple Cap SARS-CoV-2 04/27/2021, 05/18/2021    Tdap vaccine, age 7 year and older (BOOSTRIX, ADACEL) 02/28/2020        Physical Exam  Vitals reviewed.   Constitutional:       General: She is not in acute distress.     Appearance: Normal appearance. She is well-developed.   HENT:      Head: Normocephalic and atraumatic.      Right Ear: Tympanic membrane, ear canal and external ear normal.      Left Ear: Tympanic membrane, ear canal and external ear normal.      Nose: No congestion.      Mouth/Throat:      Mouth: Mucous membranes are moist.      Pharynx: Oropharynx is clear.   Eyes:      General: Lids are normal.      Conjunctiva/sclera:      Right eye: Right conjunctiva is not injected.      Left eye: Left conjunctiva is not injected.      Pupils: Pupils are equal, round, and reactive to light.   Cardiovascular:      Rate and Rhythm: Normal rate and regular rhythm.      Heart sounds: No murmur heard.  Pulmonary:      Effort: Pulmonary effort is normal. No respiratory distress.      Breath sounds: Normal breath sounds. No wheezing, rhonchi or rales.   Musculoskeletal:      Comments: Normal ROM with neck   Ttp over cervical paraspinals.    Reproduction of headache symptoms with neck movement.    Skin:     General: Skin is warm and dry.      Findings: No rash.   Neurological:      Mental Status: She is alert and oriented to person, place, and time. Mental status is at baseline.      Cranial Nerves: No cranial nerve deficit.      Motor: No weakness.   Psychiatric:         Mood and Affect: Mood normal.         Behavior: Behavior normal.         Problem List Items Addressed This Visit    None  Visit Diagnoses       Tension headache    -  Primary     Relevant Medications    naproxen (Naprosyn) 500 mg tablet            Assessment/Plan     Tension headache - naproxen 500 mg BID x 5 days, then as needed.  Take with food.  if symptoms persist follow up in office. Proceed to the nearest emergency department if condition worsens significantly/becomes severe in nature.

## 2024-09-13 ENCOUNTER — HOSPITAL ENCOUNTER (OUTPATIENT)
Dept: RADIOLOGY | Facility: HOSPITAL | Age: 39
Discharge: HOME | End: 2024-09-13
Payer: COMMERCIAL

## 2024-09-13 VITALS — BODY MASS INDEX: 24.66 KG/M2 | HEIGHT: 65 IN | WEIGHT: 148 LBS

## 2024-09-13 DIAGNOSIS — R92.8 ABNORMAL MAMMOGRAM OF LEFT BREAST: ICD-10-CM

## 2024-09-13 PROCEDURE — 77062 BREAST TOMOSYNTHESIS BI: CPT

## 2024-12-04 ENCOUNTER — APPOINTMENT (OUTPATIENT)
Dept: OBSTETRICS AND GYNECOLOGY | Facility: CLINIC | Age: 39
End: 2024-12-04
Payer: COMMERCIAL

## 2024-12-06 ENCOUNTER — APPOINTMENT (OUTPATIENT)
Dept: OBSTETRICS AND GYNECOLOGY | Facility: CLINIC | Age: 39
End: 2024-12-06
Payer: COMMERCIAL

## 2024-12-06 VITALS
DIASTOLIC BLOOD PRESSURE: 88 MMHG | HEART RATE: 74 BPM | SYSTOLIC BLOOD PRESSURE: 126 MMHG | BODY MASS INDEX: 24.32 KG/M2 | WEIGHT: 146 LBS | HEIGHT: 65 IN | OXYGEN SATURATION: 98 %

## 2024-12-06 DIAGNOSIS — N90.89 VULVAR IRRITATION: Primary | ICD-10-CM

## 2024-12-06 RX ORDER — CLOBETASOL PROPIONATE 0.5 MG/G
OINTMENT TOPICAL
Qty: 60 G | Refills: 2 | Status: SHIPPED | OUTPATIENT
Start: 2024-12-06

## 2024-12-06 ASSESSMENT — PATIENT HEALTH QUESTIONNAIRE - PHQ9
2. FEELING DOWN, DEPRESSED OR HOPELESS: NOT AT ALL
SUM OF ALL RESPONSES TO PHQ9 QUESTIONS 1 & 2: 0
1. LITTLE INTEREST OR PLEASURE IN DOING THINGS: NOT AT ALL

## 2024-12-06 NOTE — PROGRESS NOTES
"Patient presents for an annual exam  Last PAP 2021 NEG HPV-  Last Mammogram N/A    Zoya Menjivar, ALAN    ANNUAL SUBJECTIVE    Adelita EDY Rocha is a 39 y.o. female who presents for annual exam today.  Her periods are regular.  She is using nothing for contraception and is happy with this.  She has no complaints today.      PMH - none    PSH - none    OB history -   # 1 - Date: None, Sex: None, Weight: None, GA: None, Type: None, Apgar1: None, Apgar5: None, Living: None, Birth Comments: None    # 2 - Date: None, Sex: None, Weight: None, GA: None, Type: None, Apgar1: None, Apgar5: None, Living: None, Birth Comments: None    Last pap -   Normal HPV Negative     Last mammogram - 2024    Family history of breast or ovarian cancer - no    OBJECTIVE  /88 (BP Location: Left arm, Patient Position: Sitting, BP Cuff Size: Adult)   Pulse 74   Ht 1.651 m (5' 5\")   Wt 66.2 kg (146 lb)   LMP 2024   SpO2 98%   BMI 24.30 kg/m²     General Appearance   - consistent with stated age, well groomed and cooperative    Integumentary  - skin warm and dry without rash    Head and Neck  - normalocephalic and neck supple    Chest and Lung Exam  - normal breathing effort, no respiratory distress    Breast  - symmetry noted, no mass palpable, no skin change and no nipple discharge.    Abdomen  - soft, nontender and no hepatomegaly, splenomegaly, or mass    Female Genitourinary  - vulva normal without rash or lesion, normal vaginal rugae, no vaginal discharge, uterus normal size & no palpable masses, no adnexal mass, no adnexal tenderness, no cervical motion tenderness    Peripheral Vascular  - no edema present    ASSESSMENT/PLAN  39 y.o. yo  female who presents for annual exam.       Actions performed during this visit include:  - Clinical breast exam normal  - Clinical pelvic exam normal  - Pap: up to date, due next 2026  - Mammogram up to date, back to routine screening 2025  - Contraception " declines  -  Clobetasol refilled  Please return for your next visit in 1 year.    Jodi Rush MD

## 2025-08-12 ENCOUNTER — APPOINTMENT (OUTPATIENT)
Facility: CLINIC | Age: 40
End: 2025-08-12
Payer: COMMERCIAL

## 2025-08-18 ASSESSMENT — ENCOUNTER SYMPTOMS
AURA: 0
VISUAL CHANGE: 1
ALTERED MENTAL STATUS: 1
VOMITING: 0
ABDOMINAL PAIN: 0
NEAR-SYNCOPE: 1
SLURRED SPEECH: 1
HEADACHES: 1
NECK PAIN: 1
BOWEL INCONTINENCE: 0
LIGHT-HEADEDNESS: 1
PALPITATIONS: 1
FOCAL SENSORY LOSS: 0
MEMORY LOSS: 1
FEVER: 0
NEUROLOGIC COMPLAINT: 1
BACK PAIN: 0
FOCAL WEAKNESS: 1
DIAPHORESIS: 0
CONFUSION: 0
VERTIGO: 1
SHORTNESS OF BREATH: 1
WEAKNESS: 0
NAUSEA: 1
CLUMSINESS: 1
FATIGUE: 1
LOSS OF BALANCE: 1
DIZZINESS: 1

## 2025-08-19 ENCOUNTER — HOSPITAL ENCOUNTER (OUTPATIENT)
Dept: RADIOLOGY | Facility: CLINIC | Age: 40
Discharge: HOME | End: 2025-08-19
Payer: COMMERCIAL

## 2025-08-19 ENCOUNTER — APPOINTMENT (OUTPATIENT)
Facility: CLINIC | Age: 40
End: 2025-08-19
Payer: COMMERCIAL

## 2025-08-19 ENCOUNTER — APPOINTMENT (OUTPATIENT)
Dept: LAB | Facility: HOSPITAL | Age: 40
End: 2025-08-19
Payer: COMMERCIAL

## 2025-08-19 VITALS
DIASTOLIC BLOOD PRESSURE: 76 MMHG | SYSTOLIC BLOOD PRESSURE: 110 MMHG | RESPIRATION RATE: 16 BRPM | WEIGHT: 149 LBS | HEART RATE: 80 BPM | TEMPERATURE: 98 F | OXYGEN SATURATION: 98 % | BODY MASS INDEX: 24.83 KG/M2 | HEIGHT: 65 IN

## 2025-08-19 DIAGNOSIS — R06.00 DYSPNEA, UNSPECIFIED TYPE: ICD-10-CM

## 2025-08-19 DIAGNOSIS — R53.83 FATIGUE, UNSPECIFIED TYPE: ICD-10-CM

## 2025-08-19 DIAGNOSIS — K52.9 CHRONIC DIARRHEA: ICD-10-CM

## 2025-08-19 DIAGNOSIS — Z13.220 LIPID SCREENING: ICD-10-CM

## 2025-08-19 DIAGNOSIS — R10.13 EPIGASTRIC PAIN: ICD-10-CM

## 2025-08-19 DIAGNOSIS — M54.12 CERVICAL RADICULOPATHY: ICD-10-CM

## 2025-08-19 DIAGNOSIS — R07.89 CHEST TIGHTNESS: Primary | ICD-10-CM

## 2025-08-19 DIAGNOSIS — R07.89 CHEST TIGHTNESS: ICD-10-CM

## 2025-08-19 DIAGNOSIS — R20.2 TINGLING SENSATION IN FACE: ICD-10-CM

## 2025-08-19 DIAGNOSIS — K21.9 GASTROESOPHAGEAL REFLUX DISEASE, UNSPECIFIED WHETHER ESOPHAGITIS PRESENT: ICD-10-CM

## 2025-08-19 DIAGNOSIS — Z00.00 HEALTHCARE MAINTENANCE: ICD-10-CM

## 2025-08-19 DIAGNOSIS — K58.9 IRRITABLE BOWEL SYNDROME, UNSPECIFIED TYPE: ICD-10-CM

## 2025-08-19 DIAGNOSIS — Z86.69 HISTORY OF MIGRAINE HEADACHES: ICD-10-CM

## 2025-08-19 DIAGNOSIS — R51.9 PERSISTENT HEADACHES: ICD-10-CM

## 2025-08-19 DIAGNOSIS — K58.0 IRRITABLE BOWEL SYNDROME WITH DIARRHEA: ICD-10-CM

## 2025-08-19 LAB — D DIMER PPP FEU-MCNC: 221 NG/ML FEU

## 2025-08-19 PROCEDURE — 99214 OFFICE O/P EST MOD 30 MIN: CPT | Performed by: FAMILY MEDICINE

## 2025-08-19 PROCEDURE — 93000 ELECTROCARDIOGRAM COMPLETE: CPT | Performed by: FAMILY MEDICINE

## 2025-08-19 PROCEDURE — 71046 X-RAY EXAM CHEST 2 VIEWS: CPT | Performed by: RADIOLOGY

## 2025-08-19 PROCEDURE — 3008F BODY MASS INDEX DOCD: CPT | Performed by: FAMILY MEDICINE

## 2025-08-19 PROCEDURE — 71046 X-RAY EXAM CHEST 2 VIEWS: CPT

## 2025-08-19 PROCEDURE — 85379 FIBRIN DEGRADATION QUANT: CPT

## 2025-08-19 RX ORDER — METHYLPREDNISOLONE 4 MG/1
TABLET ORAL
Qty: 21 TABLET | Refills: 0 | Status: SHIPPED | OUTPATIENT
Start: 2025-08-19 | End: 2025-08-21 | Stop reason: ALTCHOICE

## 2025-08-19 ASSESSMENT — ENCOUNTER SYMPTOMS
ALTERED MENTAL STATUS: 1
NEUROLOGIC COMPLAINT: 1
VERTIGO: 1
VISUAL CHANGE: 1
HEADACHES: 1
SLURRED SPEECH: 1
ABDOMINAL PAIN: 0
FOCAL WEAKNESS: 1
CONFUSION: 0
FATIGUE: 1
LOSS OF BALANCE: 1
LIGHT-HEADEDNESS: 1
FEVER: 0
NAUSEA: 1
DIZZINESS: 1
CLUMSINESS: 1
NECK PAIN: 1
PALPITATIONS: 1
SHORTNESS OF BREATH: 1
FOCAL SENSORY LOSS: 0
MEMORY LOSS: 1
NEAR-SYNCOPE: 1
AURA: 0
BACK PAIN: 0
DIAPHORESIS: 0
BOWEL INCONTINENCE: 0
WEAKNESS: 0
VOMITING: 0

## 2025-08-20 LAB
ALBUMIN SERPL-MCNC: 4.5 G/DL (ref 3.6–5.1)
ALP SERPL-CCNC: 47 U/L (ref 31–125)
ALT SERPL-CCNC: 15 U/L (ref 6–29)
ANION GAP SERPL CALCULATED.4IONS-SCNC: 8 MMOL/L (CALC) (ref 7–17)
AST SERPL-CCNC: 13 U/L (ref 10–30)
BASOPHILS # BLD AUTO: 52 CELLS/UL (ref 0–200)
BASOPHILS NFR BLD AUTO: 0.9 %
BILIRUB SERPL-MCNC: 1.4 MG/DL (ref 0.2–1.2)
BUN SERPL-MCNC: 9 MG/DL (ref 7–25)
CALCIUM SERPL-MCNC: 9.4 MG/DL (ref 8.6–10.2)
CHLORIDE SERPL-SCNC: 105 MMOL/L (ref 98–110)
CHOLEST SERPL-MCNC: 196 MG/DL
CHOLEST/HDLC SERPL: 3.2 (CALC)
CO2 SERPL-SCNC: 25 MMOL/L (ref 20–32)
CREAT SERPL-MCNC: 0.8 MG/DL (ref 0.5–0.97)
EGFRCR SERPLBLD CKD-EPI 2021: 97 ML/MIN/1.73M2
EOSINOPHIL # BLD AUTO: 162 CELLS/UL (ref 15–500)
EOSINOPHIL NFR BLD AUTO: 2.8 %
ERYTHROCYTE [DISTWIDTH] IN BLOOD BY AUTOMATED COUNT: 13.6 % (ref 11–15)
GLUCOSE SERPL-MCNC: 76 MG/DL (ref 65–99)
HCT VFR BLD AUTO: 40.7 % (ref 35–45)
HDLC SERPL-MCNC: 62 MG/DL
HGB BLD-MCNC: 13.3 G/DL (ref 11.7–15.5)
LDLC SERPL CALC-MCNC: 116 MG/DL (CALC)
LYMPHOCYTES # BLD AUTO: 1375 CELLS/UL (ref 850–3900)
LYMPHOCYTES NFR BLD AUTO: 23.7 %
MAGNESIUM SERPL-MCNC: 2 MG/DL (ref 1.5–2.5)
MCH RBC QN AUTO: 28.5 PG (ref 27–33)
MCHC RBC AUTO-ENTMCNC: 32.7 G/DL (ref 32–36)
MCV RBC AUTO: 87.3 FL (ref 80–100)
MONOCYTES # BLD AUTO: 348 CELLS/UL (ref 200–950)
MONOCYTES NFR BLD AUTO: 6 %
NEUTROPHILS # BLD AUTO: 3863 CELLS/UL (ref 1500–7800)
NEUTROPHILS NFR BLD AUTO: 66.6 %
NONHDLC SERPL-MCNC: 134 MG/DL (CALC)
PLATELET # BLD AUTO: 305 THOUSAND/UL (ref 140–400)
PMV BLD REES-ECKER: 9 FL (ref 7.5–12.5)
POTASSIUM SERPL-SCNC: 4.6 MMOL/L (ref 3.5–5.3)
PROT SERPL-MCNC: 7.2 G/DL (ref 6.1–8.1)
RBC # BLD AUTO: 4.66 MILLION/UL (ref 3.8–5.1)
SODIUM SERPL-SCNC: 138 MMOL/L (ref 135–146)
TRIGL SERPL-MCNC: 84 MG/DL
TSH SERPL-ACNC: 0.43 MIU/L
UREA BREATH TEST QL: NOT DETECTED
VIT B12 SERPL-MCNC: 338 PG/ML (ref 200–1100)
WBC # BLD AUTO: 5.8 THOUSAND/UL (ref 3.8–10.8)

## 2025-08-21 ENCOUNTER — OFFICE VISIT (OUTPATIENT)
Dept: GASTROENTEROLOGY | Facility: CLINIC | Age: 40
End: 2025-08-21
Payer: COMMERCIAL

## 2025-08-21 VITALS
HEIGHT: 66 IN | DIASTOLIC BLOOD PRESSURE: 86 MMHG | OXYGEN SATURATION: 97 % | HEART RATE: 88 BPM | SYSTOLIC BLOOD PRESSURE: 130 MMHG | WEIGHT: 150.6 LBS | RESPIRATION RATE: 18 BRPM | BODY MASS INDEX: 24.2 KG/M2

## 2025-08-21 DIAGNOSIS — R10.84 DIFFUSE ABDOMINAL PAIN: ICD-10-CM

## 2025-08-21 DIAGNOSIS — Z83.79 FAMILY HISTORY OF ULCERATIVE COLITIS: ICD-10-CM

## 2025-08-21 DIAGNOSIS — K52.9 CHRONIC DIARRHEA: Primary | ICD-10-CM

## 2025-08-21 PROCEDURE — 3008F BODY MASS INDEX DOCD: CPT | Performed by: INTERNAL MEDICINE

## 2025-08-21 PROCEDURE — 99204 OFFICE O/P NEW MOD 45 MIN: CPT | Performed by: INTERNAL MEDICINE

## 2025-08-21 RX ORDER — SODIUM, POTASSIUM,MAG SULFATES 17.5-3.13G
SOLUTION, RECONSTITUTED, ORAL ORAL
Qty: 1 EACH | Refills: 0 | Status: SHIPPED | OUTPATIENT
Start: 2025-08-21

## 2025-08-21 ASSESSMENT — ENCOUNTER SYMPTOMS
CARDIOVASCULAR NEGATIVE: 1
ABDOMINAL PAIN: 1
DIARRHEA: 1
CONSTITUTIONAL NEGATIVE: 1
NEUROLOGICAL NEGATIVE: 1
RESPIRATORY NEGATIVE: 1
ENDOCRINE NEGATIVE: 1

## 2025-08-23 LAB
GLIADIN IGA SER IA-ACNC: <1 U/ML
GLIADIN IGG SER IA-ACNC: <1 U/ML
IGA SERPL-MCNC: 309 MG/DL (ref 47–310)
TTG IGA SER-ACNC: <1 U/ML
TTG IGG SER-ACNC: <1 U/ML

## 2025-08-29 ENCOUNTER — APPOINTMENT (OUTPATIENT)
Facility: CLINIC | Age: 40
End: 2025-08-29
Payer: COMMERCIAL

## 2025-09-19 ENCOUNTER — APPOINTMENT (OUTPATIENT)
Dept: GASTROENTEROLOGY | Facility: EXTERNAL LOCATION | Age: 40
End: 2025-09-19
Payer: COMMERCIAL

## 2026-01-06 ENCOUNTER — APPOINTMENT (OUTPATIENT)
Dept: OBSTETRICS AND GYNECOLOGY | Facility: CLINIC | Age: 41
End: 2026-01-06
Payer: COMMERCIAL